# Patient Record
(demographics unavailable — no encounter records)

---

## 2024-12-13 NOTE — HISTORY OF PRESENT ILLNESS
[FreeTextEntry1] : follow-up, gout [de-identified] : 29 year old male presents for a routine visit.  Currently he has no acute medical complaints.

## 2024-12-13 NOTE — PLAN
[FreeTextEntry1] : HTN - acceptable control, c/w enalapril. HCTZ was held in view of hyperuricemia Obesity - advised diet/ls modifications H/o Gout - not on uric acid lowering therapy, will check labs  f/u in 3 months

## 2024-12-13 NOTE — PHYSICAL EXAM
[No Acute Distress] : no acute distress [Normal Sclera/Conjunctiva] : normal sclera/conjunctiva [Normal Outer Ear/Nose] : the outer ears and nose were normal in appearance [No JVD] : no jugular venous distention [No Respiratory Distress] : no respiratory distress  [No Accessory Muscle Use] : no accessory muscle use [Clear to Auscultation] : lungs were clear to auscultation bilaterally [Normal Rate] : normal rate  [Regular Rhythm] : with a regular rhythm [Normal S1, S2] : normal S1 and S2 [No Edema] : there was no peripheral edema [No CVA Tenderness] : no CVA  tenderness

## 2025-07-09 NOTE — HISTORY OF PRESENT ILLNESS
[FreeTextEntry1] : follow-up, HTN, urinary hesitancy [de-identified] : 30 year old male presents for a f/u visit.  Currently he has no acute medical complaints. He denies any chest pain, palpitations or shortness of breath. He reports that recently he feels a sense of urinary urgency at the end of voiding.  He denies any urinary frequency, burning with urination or fever.

## 2025-07-09 NOTE — PLAN
[FreeTextEntry1] : HTN - chronic, acceptable control, c/w enalapril. HCTZ was held in view of hyperuricemia Obesity - chronic, advised diet/ls modifications H/o Gout - chronic, not on uric acid lowering therapy, will check labs Urinary hesitancy - will check labs, consider urology referral if still persistent  f/u in 3 months

## 2025-07-09 NOTE — HISTORY OF PRESENT ILLNESS
[FreeTextEntry1] : follow-up, HTN, urinary hesitancy [de-identified] : 30 year old male presents for a f/u visit.  Currently he has no acute medical complaints. He denies any chest pain, palpitations or shortness of breath. He reports that recently he feels a sense of urinary urgency at the end of voiding.  He denies any urinary frequency, burning with urination or fever.